# Patient Record
Sex: FEMALE | Race: WHITE | NOT HISPANIC OR LATINO | Employment: OTHER | URBAN - METROPOLITAN AREA
[De-identification: names, ages, dates, MRNs, and addresses within clinical notes are randomized per-mention and may not be internally consistent; named-entity substitution may affect disease eponyms.]

---

## 2023-11-28 ENCOUNTER — OFFICE VISIT (OUTPATIENT)
Dept: OBSTETRICS AND GYNECOLOGY | Facility: CLINIC | Age: 73
End: 2023-11-28
Payer: MEDICARE

## 2023-11-28 VITALS
SYSTOLIC BLOOD PRESSURE: 153 MMHG | WEIGHT: 138.2 LBS | HEIGHT: 64 IN | BODY MASS INDEX: 23.6 KG/M2 | DIASTOLIC BLOOD PRESSURE: 70 MMHG

## 2023-11-28 DIAGNOSIS — Z12.31 ENCOUNTER FOR SCREENING MAMMOGRAM FOR MALIGNANT NEOPLASM OF BREAST: ICD-10-CM

## 2023-11-28 DIAGNOSIS — M81.0 AGE-RELATED OSTEOPOROSIS WITHOUT CURRENT PATHOLOGICAL FRACTURE: ICD-10-CM

## 2023-11-28 DIAGNOSIS — N95.1 MENOPAUSAL SYMPTOM: Primary | ICD-10-CM

## 2023-11-28 PROCEDURE — 99213 OFFICE O/P EST LOW 20 MIN: CPT

## 2023-11-28 PROCEDURE — 1036F TOBACCO NON-USER: CPT

## 2023-11-28 PROCEDURE — 1159F MED LIST DOCD IN RCRD: CPT

## 2023-11-28 PROCEDURE — 1160F RVW MEDS BY RX/DR IN RCRD: CPT

## 2023-11-28 RX ORDER — FERROUS SULFATE, DRIED 160(50) MG
1 TABLET, EXTENDED RELEASE ORAL 3 TIMES DAILY
COMMUNITY
Start: 2023-11-09

## 2023-11-28 RX ORDER — MULTIVITAMIN
TABLET ORAL
COMMUNITY

## 2023-11-28 RX ORDER — ALENDRONATE SODIUM 70 MG/1
1 TABLET ORAL WEEKLY
COMMUNITY
Start: 2021-11-11 | End: 2024-05-07 | Stop reason: SDUPTHER

## 2023-11-28 ASSESSMENT — ENCOUNTER SYMPTOMS
CARDIOVASCULAR NEGATIVE: 0
ENDOCRINE NEGATIVE: 0
FATIGUE: 0
CHILLS: 0
SHORTNESS OF BREATH: 0
VOMITING: 0
EYES NEGATIVE: 0
ALLERGIC/IMMUNOLOGIC NEGATIVE: 0
NEUROLOGICAL NEGATIVE: 0
HEMATOLOGIC/LYMPHATIC NEGATIVE: 0
RESPIRATORY NEGATIVE: 0
ANAL BLEEDING: 0
DIZZINESS: 0
UNEXPECTED WEIGHT CHANGE: 0
NAUSEA: 0
ABDOMINAL PAIN: 0
COUGH: 0
DYSURIA: 0
PSYCHIATRIC NEGATIVE: 0
HEADACHES: 0
CONSTIPATION: 0
MUSCULOSKELETAL NEGATIVE: 0
BLOOD IN STOOL: 0
FEVER: 0
DIARRHEA: 0
GASTROINTESTINAL NEGATIVE: 0
CONSTITUTIONAL NEGATIVE: 0
COLOR CHANGE: 0

## 2023-11-28 ASSESSMENT — LIFESTYLE VARIABLES
SKIP TO QUESTIONS 9-10: 1
HOW OFTEN DO YOU HAVE SIX OR MORE DRINKS ON ONE OCCASION: NEVER
AUDIT-C TOTAL SCORE: 1
HOW OFTEN DO YOU HAVE A DRINK CONTAINING ALCOHOL: MONTHLY OR LESS
HOW MANY STANDARD DRINKS CONTAINING ALCOHOL DO YOU HAVE ON A TYPICAL DAY: 1 OR 2

## 2023-11-28 ASSESSMENT — PATIENT HEALTH QUESTIONNAIRE - PHQ9
2. FEELING DOWN, DEPRESSED OR HOPELESS: NOT AT ALL
SUM OF ALL RESPONSES TO PHQ9 QUESTIONS 1 & 2: 0
1. LITTLE INTEREST OR PLEASURE IN DOING THINGS: NOT AT ALL

## 2023-11-28 NOTE — PROGRESS NOTES
"Subjective   Catie Mazariegos is a 73 y.o. female who is here for a routine GYN exam.     Last saw Dr. Jacobo 10/2022. Since last seen, she developed kidney stone - dx'ed in ED 3/2023; follow up with her PCP showed elevated calcium levels, further saw endocrinology Dr. Remy for further evaluation which showed elevated PTH, saw endocrine surgery Dr. Marina Perkins with [facility] with parathyroidectomy on 23. Endocrinology currently has her supplementing with CA/D. Her labs have now normalized again, currently stable. Will be seeing endocrinology again soon. Currently still taking Fosamax for her history of osteoporosis.    Denies vaginal bleeding. Denies breast changes or concerns. Denies hematochezia or rectal bleeding.     Complaints: see HPI  Periods: menopausal  Pain: none    HRT: no  History of abnormal Pap smear: no  History of abnormal mammogram: no      OB History          3    Para   2    Term   2            AB   1    Living   2         SAB        IAB        Ectopic        Multiple        Live Births   2                  Review of Systems   Constitutional:  Negative for chills, fatigue, fever and unexpected weight change.   Respiratory:  Negative for cough and shortness of breath.    Gastrointestinal:  Negative for abdominal pain, anal bleeding, blood in stool, constipation, diarrhea, nausea and vomiting.   Genitourinary:  Negative for dyspareunia, dysuria, pelvic pain and vaginal discharge.   Skin:  Negative for color change and rash.   Neurological:  Negative for dizziness and headaches.       Objective   /70   Ht 1.626 m (5' 4\")   Wt 62.7 kg (138 lb 3.2 oz)   BMI 23.72 kg/m²        General:   Alert and oriented, in no acute distress   Neck: Supple. No visible thyromegaly.    Breast/Axilla: Normal to palpation bilaterally without masses, skin changes, or nipple discharge.    Abdomen: Soft, non-tender, without masses or organomegaly   Vulva: Normal architecture without erythema, masses, or " lesions.    Vagina: Normal mucosa without lesions, masses, or atrophy. No abnormal vaginal discharge.    Cervix: Normal; cervical polyp from os   Uterus: Normal mobile, non-enlarged uterus    Adnexa: Normal without masses or lesions   Pelvic Floor No POP noted. No high tone pelvic floor    Psych Normal affect. Normal mood.      Assessment/Plan   -No further need for pap based on age and history.  -Due for mammogram, ordered.  -UTD on cologuard, last completed 12/2022 negative.  -Recommended removal of what appears to be cervical polyp with one of the physicians in the office; patient will schedule.  -Hx DEXA confirmed osteoporosis, last scan 2021, due at this time for routine DEXA, ordered; patient currently taking Fosamax for about 2 years now, will reassess med tx with updated DEXA results. Most recent blood work s/p parathyroidectomy WNL, stable: 11/20/23 - calcium 9.3, PTH 40.    Maeve Shaffer PA-C

## 2023-12-13 ENCOUNTER — PROCEDURE VISIT (OUTPATIENT)
Dept: OBSTETRICS AND GYNECOLOGY | Facility: CLINIC | Age: 73
End: 2023-12-13
Payer: MEDICARE

## 2023-12-13 VITALS
HEIGHT: 64 IN | DIASTOLIC BLOOD PRESSURE: 74 MMHG | WEIGHT: 138 LBS | BODY MASS INDEX: 23.56 KG/M2 | SYSTOLIC BLOOD PRESSURE: 136 MMHG

## 2023-12-13 DIAGNOSIS — N84.1 CERVICAL POLYP: Primary | ICD-10-CM

## 2023-12-13 PROCEDURE — 57500 BIOPSY OF CERVIX: CPT | Performed by: OBSTETRICS & GYNECOLOGY

## 2023-12-13 PROCEDURE — 88305 TISSUE EXAM BY PATHOLOGIST: CPT | Mod: TC | Performed by: OBSTETRICS & GYNECOLOGY

## 2023-12-13 PROCEDURE — 88305 TISSUE EXAM BY PATHOLOGIST: CPT | Performed by: PATHOLOGY

## 2023-12-13 ASSESSMENT — LIFESTYLE VARIABLES
AUDIT-C TOTAL SCORE: 2
HOW OFTEN DO YOU HAVE SIX OR MORE DRINKS ON ONE OCCASION: NEVER
SKIP TO QUESTIONS 9-10: 1
HOW MANY STANDARD DRINKS CONTAINING ALCOHOL DO YOU HAVE ON A TYPICAL DAY: 1 OR 2
HOW OFTEN DO YOU HAVE A DRINK CONTAINING ALCOHOL: 2-4 TIMES A MONTH

## 2023-12-13 ASSESSMENT — PAIN SCALES - GENERAL: PAINLEVEL: 0-NO PAIN

## 2023-12-13 ASSESSMENT — ENCOUNTER SYMPTOMS
LOSS OF SENSATION IN FEET: 0
DEPRESSION: 0
OCCASIONAL FEELINGS OF UNSTEADINESS: 0

## 2023-12-13 NOTE — PROGRESS NOTES
Cervical biopsy    Date/Time: 12/13/2023 8:35 AM    Performed by: Glory Madison MD  Authorized by: Glory Madison MD    Consent:     Consent obtained:  Verbal    Consent given by:  Patient    Procedural risks discussed: yes.  Universal protocol:     Procedure explained and questions answered to patient or proxy's satisfaction: yes    Indications:     Indications:  Cervical polyp  Sedation:     Sedation type:  None  Anesthesia:     Anesthesia method:  None  Procedure specific details:      Polyp grasped with ring forceps and removed in total  Post-procedure details:     Procedure completion:  Tolerated well, no immediate complications

## 2023-12-15 LAB
LABORATORY COMMENT REPORT: NORMAL
PATH REPORT.FINAL DX SPEC: NORMAL
PATH REPORT.GROSS SPEC: NORMAL
PATH REPORT.RELEVANT HX SPEC: NORMAL
PATH REPORT.TOTAL CANCER: NORMAL

## 2024-01-04 ENCOUNTER — ANCILLARY PROCEDURE (OUTPATIENT)
Dept: RADIOLOGY | Facility: CLINIC | Age: 74
End: 2024-01-04
Payer: MEDICARE

## 2024-01-04 VITALS — WEIGHT: 136 LBS | HEIGHT: 64 IN | BODY MASS INDEX: 23.22 KG/M2

## 2024-01-04 DIAGNOSIS — Z12.31 ENCOUNTER FOR SCREENING MAMMOGRAM FOR MALIGNANT NEOPLASM OF BREAST: ICD-10-CM

## 2024-01-04 DIAGNOSIS — M81.0 AGE-RELATED OSTEOPOROSIS WITHOUT CURRENT PATHOLOGICAL FRACTURE: ICD-10-CM

## 2024-01-04 PROCEDURE — 77085 DXA BONE DENSITY AXL VRT FX: CPT

## 2024-01-04 PROCEDURE — 77067 SCR MAMMO BI INCL CAD: CPT

## 2024-05-07 ENCOUNTER — TELEPHONE (OUTPATIENT)
Dept: OBSTETRICS AND GYNECOLOGY | Facility: CLINIC | Age: 74
End: 2024-05-07
Payer: MEDICARE

## 2024-05-07 DIAGNOSIS — M81.0 AGE-RELATED OSTEOPOROSIS WITHOUT CURRENT PATHOLOGICAL FRACTURE: Primary | ICD-10-CM

## 2024-05-07 RX ORDER — ALENDRONATE SODIUM 70 MG/1
70 TABLET ORAL
Qty: 12 TABLET | Refills: 3 | Status: SHIPPED | OUTPATIENT
Start: 2024-05-07 | End: 2025-05-07

## 2024-05-07 NOTE — TELEPHONE ENCOUNTER
Pt calling requesting refill for her Alendronate 70 mg to be sent to her mail [facility]

## 2024-06-17 ENCOUNTER — APPOINTMENT (OUTPATIENT)
Dept: PRIMARY CARE | Facility: CLINIC | Age: 74
End: 2024-06-17
Payer: MEDICARE

## 2024-06-20 ASSESSMENT — PROMIS GLOBAL HEALTH SCALE
RATE_GENERAL_HEALTH: VERY GOOD
RATE_QUALITY_OF_LIFE: EXCELLENT
RATE_SOCIAL_SATISFACTION: EXCELLENT
RATE_MENTAL_HEALTH: EXCELLENT
CARRYOUT_SOCIAL_ACTIVITIES: EXCELLENT
CARRYOUT_PHYSICAL_ACTIVITIES: COMPLETELY
EMOTIONAL_PROBLEMS: NEVER
RATE_PHYSICAL_HEALTH: VERY GOOD
RATE_AVERAGE_PAIN: 0

## 2024-06-21 ENCOUNTER — OFFICE VISIT (OUTPATIENT)
Dept: PRIMARY CARE | Facility: CLINIC | Age: 74
End: 2024-06-21
Payer: MEDICARE

## 2024-06-21 VITALS
SYSTOLIC BLOOD PRESSURE: 128 MMHG | OXYGEN SATURATION: 96 % | BODY MASS INDEX: 23.87 KG/M2 | HEART RATE: 75 BPM | TEMPERATURE: 97.2 F | HEIGHT: 64 IN | DIASTOLIC BLOOD PRESSURE: 80 MMHG | WEIGHT: 139.8 LBS

## 2024-06-21 DIAGNOSIS — Z00.00 MEDICARE ANNUAL WELLNESS VISIT, SUBSEQUENT: Primary | ICD-10-CM

## 2024-06-21 DIAGNOSIS — Z11.59 NEED FOR HEPATITIS C SCREENING TEST: ICD-10-CM

## 2024-06-21 PROCEDURE — 1160F RVW MEDS BY RX/DR IN RCRD: CPT | Performed by: FAMILY MEDICINE

## 2024-06-21 PROCEDURE — 1158F ADVNC CARE PLAN TLK DOCD: CPT | Performed by: FAMILY MEDICINE

## 2024-06-21 PROCEDURE — 99215 OFFICE O/P EST HI 40 MIN: CPT | Performed by: FAMILY MEDICINE

## 2024-06-21 PROCEDURE — 1123F ACP DISCUSS/DSCN MKR DOCD: CPT | Performed by: FAMILY MEDICINE

## 2024-06-21 PROCEDURE — G0439 PPPS, SUBSEQ VISIT: HCPCS | Performed by: FAMILY MEDICINE

## 2024-06-21 PROCEDURE — 1159F MED LIST DOCD IN RCRD: CPT | Performed by: FAMILY MEDICINE

## 2024-06-21 PROCEDURE — 1126F AMNT PAIN NOTED NONE PRSNT: CPT | Performed by: FAMILY MEDICINE

## 2024-06-21 RX ORDER — IBUPROFEN 100 MG/5ML
1000 SUSPENSION, ORAL (FINAL DOSE FORM) ORAL DAILY
COMMUNITY

## 2024-06-21 ASSESSMENT — COLUMBIA-SUICIDE SEVERITY RATING SCALE - C-SSRS
6. HAVE YOU EVER DONE ANYTHING, STARTED TO DO ANYTHING, OR PREPARED TO DO ANYTHING TO END YOUR LIFE?: NO
1. IN THE PAST MONTH, HAVE YOU WISHED YOU WERE DEAD OR WISHED YOU COULD GO TO SLEEP AND NOT WAKE UP?: NO
2. HAVE YOU ACTUALLY HAD ANY THOUGHTS OF KILLING YOURSELF?: NO

## 2024-06-21 ASSESSMENT — PAIN SCALES - GENERAL: PAINLEVEL: 0-NO PAIN

## 2024-06-21 ASSESSMENT — ENCOUNTER SYMPTOMS
LOSS OF SENSATION IN FEET: 0
DEPRESSION: 0
OCCASIONAL FEELINGS OF UNSTEADINESS: 0

## 2024-06-21 ASSESSMENT — PATIENT HEALTH QUESTIONNAIRE - PHQ9
1. LITTLE INTEREST OR PLEASURE IN DOING THINGS: NOT AT ALL
SUM OF ALL RESPONSES TO PHQ9 QUESTIONS 1 AND 2: 0
2. FEELING DOWN, DEPRESSED OR HOPELESS: NOT AT ALL

## 2024-06-21 NOTE — PROGRESS NOTES
Outpatient Visit Note    Chief Complaint   Patient presents with    Medicare Annual Wellness Visit Subsequent       HPI:  Catie Mazariegos is a 73 y.o. female here  for a Medicare Wellness Visit.    Health Maintenance  Immunizations: Tdap vaccination received at pharmacy 2023, next due 2033. Shingles vaccine is due.  Pneumococcal vaccination is due    Denies smoking or illicit drug use, drinks 1 alcoholic beverage a month. Patient reports routine vision checks and dental cleanings, and regular exercise with yoga and pilates, and walking. Patient is not fasting for routine blood work today.     Cologuard next due 2025. Screening pap N/A. Screening mammogram done recently and was negative. DEXA done recently, referred to rheumatology given her osteoporosis with slight worsening in the left femur neck and history of parathyroidectomy with being on Fosamax. She is seeing endocrinology who is managing this for her.     Otherwise denies fevers, chills, cold/flu symptoms, SOB, CP, N/V, abdominal pain, dysuria, hematuria, melena, diarrhea or LE edema     Advanced directives: some are in place      Living Will: Not Received    Healthcare Power of Atty: Not Received     Hearing screen: reports no difficulty with hearing and passes finger rub test bilaterally    Does the patient use opioid medications: No     How does the patient rate their health status today: excellent    Cognitive Screen:  AAAx3  to person, place and time: Yes  3 word recall: Apple, Car, Shoe - Immediate recall: Yes         - 5 minutes recall: Yes   Impression: No cognitive deficiency observed during screening or encounter today      Reviewed:   Past Medical History/Allergies:  Yes  Family History:  Yes  Social History:  Yes  Current Medications:  Yes  Vital Signs:  Yes  Advanced Directives:  discussed  Immunizations:  reviewed today  Home Safety:                    Up & Go test > 30 seconds?  No                   Home have rugs; lack grab bars in  bathroom; lack handrail on stairs; have poor lighting?  No                   Hearing difficulties?  No  Geriatric Assessment                   ADL areas requiring assistance:  Does not need help with Dressing, Eating, Ambulating, Toileting, Grooming, Hygiene.                    IADL areas requiring assistance:  Does not need help with Shopping, Housework, Accounting, Transportation, Driving.   Medications: reviewed  Current supplements:  Reviewed and recorded.   Other providers: Reviewed and recorded - Current providers and suppliers: Dr. Campbell, Dr. Luna, Dr. Perkins. Dr. Madison      PHQ9/GAD7:         Patient Active Problem List    Diagnosis Date Noted    Menopausal symptom 11/28/2023    Age-related osteoporosis without current pathological fracture 11/28/2023    Encounter for screening mammogram for malignant neoplasm of breast 11/28/2023        Past Medical History:   Diagnosis Date    Fibroid in the 1990's    History of uterine fibroid     Kidney stone 3-    Varicella         Current Medications  Current Outpatient Medications   Medication Instructions    alendronate (FOSAMAX) 70 mg, oral, Every 7 days    ascorbic acid (VITAMIN C) 1,000 mg, oral, Daily    calcium carbonate-vitamin D3 500 mg-5 mcg (200 unit) tablet 1 tablet, oral, 3 times daily    multivit with min-folic acid (One-A-Day Women's 50 Plus) 0.4 mg tablet as directed Orally        Allergies  Allergies   Allergen Reactions    Phenobarbital Hives and Rash        Immunizations  Immunization History   Administered Date(s) Administered    Flu vaccine (IIV4), preservative free *Check age/dose* 11/06/2020    Flu vaccine, quadrivalent, high-dose, preservative free, age 65y+ (FLUZONE) 10/17/2022    Influenza, High Dose Seasonal, Preservative Free 03/10/2020    Influenza, Seasonal, Quadrivalent, Adjuvanted 11/10/2021, 10/07/2023    Moderna COVID-19 vaccine, bivalent, blue cap/gray label *Check age/dose* 11/14/2022    Moderna SARS-CoV-2 Vaccination  03/10/2021, 01/06/2022        Past Surgical History:   Procedure Laterality Date    MYOMECTOMY  in the  1990's    OTHER SURGICAL HISTORY  2005    LIPOMA REMOVED FROM BACK    PARATHYROIDECTOMY  11/09/2023    LOWER LEFT    TONSILLECTOMY      UTERINE FIBROID SURGERY  1990    REMOVED     Family History   Problem Relation Name Age of Onset    Heart disease Mother Jelena     Heart disease Father Ascencion      Social History     Tobacco Use    Smoking status: Never    Smokeless tobacco: Never   Vaping Use    Vaping status: Never Used   Substance Use Topics    Alcohol use: Yes    Drug use: Never     Tobacco Use: Low Risk  (6/21/2024)    Patient History     Smoking Tobacco Use: Never     Smokeless Tobacco Use: Never     Passive Exposure: Not on file        ROS  All pertinent positive symptoms are included in the history of present illness.  All other systems have been reviewed and are negative and noncontributory to this patient's current ailments.    VITAL SIGNS  Vitals:    06/21/24 1042   BP: 128/80   Pulse: 75   Temp: 36.2 °C (97.2 °F)   SpO2: 96%     Vitals:    06/21/24 1042   Weight: 63.4 kg (139 lb 12.8 oz)      Body mass index is 24 kg/m².     PHYSICAL EXAM  GENERAL APPEARANCE: well nourished, well developed, looks like stated age, in no acute distress, not ill or tired appearing, conversing well.   HEENT: no trauma, normocephalic.   NECK: supple without rigidity, no neck mass was observed.   LUNGS: good chest wall expansion. In no acute respiratory distress.   EXTREMITIES: moving all extremities equally with no edema.   SKIN: normal skin color and pigmentation, without rash. Healed scar on neck.   NEUROLOGIC EXAM: CN II-XII grossly intact, normal gait.   PSYCH: mood and affect appropriate; alert and oriented to time, place, person; no difficulty with speech or language.     Counseling:       Medication education:           Education:  The patient is counseled regarding potential side-effects of all new medications           Understanding:  Patient expressed understanding of information conveyed today          Adherence:  No barriers to adherence identified    Preventative Services reviewed with patient and copy printed for patient.    Assessment/Plan   Problem List Items Addressed This Visit    None  Visit Diagnoses         Codes    Medicare annual wellness visit, subsequent    -  Primary Z00.00    Relevant Orders    Hepatitis C Antibody    Need for hepatitis C screening test     Z11.59    Relevant Orders    Hepatitis C Antibody            Additional Visit Plans:  Notes:  PREVENTATIVE HEALTH SCREENINGS INCLUDED:  - Blood pressure screen.  - Blood work may include a cholesterol and diabetes screen if risk factors exist (overweight, high blood pressure etc); screening for sexually transmitted infections; a one time HIV screen for all individuals, and Hepatitis C Virus screening  - I encourage you to eat a low-fat, moderate-carbohydrate, low-calorie diet to maintain a normal BMI (under 25) to reduce heart disease, and risk for diabetes  - Moderate intensity exercise for 30 minutes 5 days per week is recommended  - Helpful resources recommended by the American Academy of Family Practice can be found at www.familydoctor.org or www.choosemyplate.gov  - Can also consider enrolling in a program such as Weight Watchers or [employer]. The most effective diet is going to be one you can follow long term and turn into a lifestyle. The CDC recommends losing 0.5-2 lbs a week if overweight or obese.  - I recommend a routine vision check and dental cleanings every 6 months    - Colon cancer screening for all ages 45-75 or 85 years old periodically depending on results.  Cologuard next due 2025  - Cervical cancer screening (pap test) in women between 21-65 years old, periodically depending on results.  Not applicable  - Mammogram screening for breast cancer in women starting at 40-50 years and every 1-2 years.  Up-to-date  - Bone density screening  (DEXA) for osteoporosis in women aged 65 years and older, once every two years if needed.  Up-to-date, continue to follow with the specialist    IMMUNIZATIONS:  - Flu shot annually.  - Tetanus booster every 10 years.  Up to date. Next due 2032  - Two pneumococcal vaccinations after 65 years old. Please get this at the pharmacy and call us to update your records.   - Shingles vaccine for those 50 years or older.  Plan to check pricing at the pharmacy    This was a shared decision making visit.    Next Wellness Exam Due  In 1 year from today      Phillip Campbell DO   06/21/24   11:17 AM

## 2024-06-21 NOTE — PATIENT INSTRUCTIONS
Problem List Items Addressed This Visit    None  Visit Diagnoses         Codes    Medicare annual wellness visit, subsequent    -  Primary Z00.00    Relevant Orders    Hepatitis C Antibody    Need for hepatitis C screening test     Z11.59    Relevant Orders    Hepatitis C Antibody            Additional Visit Plans:  Notes:  PREVENTATIVE HEALTH SCREENINGS INCLUDED:  - Blood pressure screen.  - Blood work may include a cholesterol and diabetes screen if risk factors exist (overweight, high blood pressure etc); screening for sexually transmitted infections; a one time HIV screen for all individuals, and Hepatitis C Virus screening  - I encourage you to eat a low-fat, moderate-carbohydrate, low-calorie diet to maintain a normal BMI (under 25) to reduce heart disease, and risk for diabetes  - Moderate intensity exercise for 30 minutes 5 days per week is recommended  - Helpful resources recommended by the American Academy of Family Practice can be found at www.familydoctor.org or www.choosemyplate.gov  - Can also consider enrolling in a program such as Weight Watchers or [employer]. The most effective diet is going to be one you can follow long term and turn into a lifestyle. The CDC recommends losing 0.5-2 lbs a week if overweight or obese.  - I recommend a routine vision check and dental cleanings every 6 months    - Colon cancer screening for all ages 45-75 or 85 years old periodically depending on results.  Cologuard next due 2025  - Cervical cancer screening (pap test) in women between 21-65 years old, periodically depending on results.  Not applicable  - Mammogram screening for breast cancer in women starting at 40-50 years and every 1-2 years.  Up-to-date  - Bone density screening (DEXA) for osteoporosis in women aged 65 years and older, once every two years if needed.  Up-to-date, continue to follow with the specialist    IMMUNIZATIONS:  - Flu shot annually.  - Tetanus booster every 10 years.  Up to date. Next  due 2032  - Two pneumococcal vaccinations after 65 years old. Please get this at the pharmacy and call us to update your records.   - Shingles vaccine for those 50 years or older.  Plan to check pricing at the pharmacy    This was a shared decision making visit.    Next Wellness Exam Due  In 1 year from today      Phillip Campbell DO   06/21/24   11:16 AM

## 2025-01-16 ENCOUNTER — OFFICE VISIT (OUTPATIENT)
Dept: OBSTETRICS AND GYNECOLOGY | Facility: CLINIC | Age: 75
End: 2025-01-16
Payer: MEDICARE

## 2025-01-16 ENCOUNTER — LAB (OUTPATIENT)
Dept: LAB | Facility: LAB | Age: 75
End: 2025-01-16
Payer: MEDICARE

## 2025-01-16 VITALS
DIASTOLIC BLOOD PRESSURE: 64 MMHG | WEIGHT: 142 LBS | HEIGHT: 64 IN | BODY MASS INDEX: 24.24 KG/M2 | SYSTOLIC BLOOD PRESSURE: 176 MMHG

## 2025-01-16 DIAGNOSIS — Z11.59 NEED FOR HEPATITIS C SCREENING TEST: ICD-10-CM

## 2025-01-16 DIAGNOSIS — Z78.0 MENOPAUSE: Primary | ICD-10-CM

## 2025-01-16 DIAGNOSIS — M81.0 AGE-RELATED OSTEOPOROSIS WITHOUT CURRENT PATHOLOGICAL FRACTURE: ICD-10-CM

## 2025-01-16 DIAGNOSIS — Z00.00 MEDICARE ANNUAL WELLNESS VISIT, SUBSEQUENT: ICD-10-CM

## 2025-01-16 DIAGNOSIS — N81.4 UTERINE PROLAPSE: ICD-10-CM

## 2025-01-16 DIAGNOSIS — Z12.31 ENCOUNTER FOR SCREENING MAMMOGRAM FOR MALIGNANT NEOPLASM OF BREAST: ICD-10-CM

## 2025-01-16 PROCEDURE — 99213 OFFICE O/P EST LOW 20 MIN: CPT

## 2025-01-16 PROCEDURE — 1160F RVW MEDS BY RX/DR IN RCRD: CPT

## 2025-01-16 PROCEDURE — G0472 HEP C SCREEN HIGH RISK/OTHER: HCPCS

## 2025-01-16 PROCEDURE — 3008F BODY MASS INDEX DOCD: CPT

## 2025-01-16 PROCEDURE — 1159F MED LIST DOCD IN RCRD: CPT

## 2025-01-16 PROCEDURE — 1123F ACP DISCUSS/DSCN MKR DOCD: CPT

## 2025-01-16 PROCEDURE — 1126F AMNT PAIN NOTED NONE PRSNT: CPT

## 2025-01-16 PROCEDURE — 1036F TOBACCO NON-USER: CPT

## 2025-01-16 ASSESSMENT — ENCOUNTER SYMPTOMS
DEPRESSION: 0
LOSS OF SENSATION IN FEET: 0
OCCASIONAL FEELINGS OF UNSTEADINESS: 0

## 2025-01-16 ASSESSMENT — PATIENT HEALTH QUESTIONNAIRE - PHQ9
SUM OF ALL RESPONSES TO PHQ9 QUESTIONS 1 & 2: 0
1. LITTLE INTEREST OR PLEASURE IN DOING THINGS: NOT AT ALL
2. FEELING DOWN, DEPRESSED OR HOPELESS: NOT AT ALL

## 2025-01-16 ASSESSMENT — PAIN SCALES - GENERAL: PAINLEVEL_OUTOF10: 0-NO PAIN

## 2025-01-16 ASSESSMENT — LIFESTYLE VARIABLES
HOW MANY STANDARD DRINKS CONTAINING ALCOHOL DO YOU HAVE ON A TYPICAL DAY: 1 OR 2
AUDIT-C TOTAL SCORE: 1
HOW OFTEN DO YOU HAVE A DRINK CONTAINING ALCOHOL: MONTHLY OR LESS
HOW OFTEN DO YOU HAVE SIX OR MORE DRINKS ON ONE OCCASION: NEVER
SKIP TO QUESTIONS 9-10: 1

## 2025-01-16 NOTE — PROGRESS NOTES
"Subjective   Catie Mazariegos is a 74 y.o. female who is here for a routine menopausal visit. I last saw her 2023.  -Had cervical polyp removed with Dr. Madison 2023; denies vaginal bleeding.  -Hx of osteoporosis; hx of parathyroidectomy; sees Dr. Sandrita mathur at [facility]; upcoming appt 3/2025; she remains on Fosamax per endo but may be stopping this year. Last DEXA 2024.  -Denies breast changes or vaginal concerns today.    Complaints:   none  HRT: no   History of abnormal Pap smear: no  History of abnormal mammogram: no      OB History          3    Para   2    Term   2            AB   1    Living   2         SAB        IAB        Ectopic        Multiple        Live Births   2                  Review of Systems   Constitutional:  Negative for chills, fatigue, fever and unexpected weight change.   Respiratory:  Negative for cough and shortness of breath.    Gastrointestinal:  Negative for abdominal pain, nausea and vomiting.   Genitourinary:  Negative for dyspareunia, dysuria, pelvic pain and vaginal discharge.   Skin:  Negative for color change and rash.   Neurological:  Negative for dizziness and headaches.       Objective   /64   Ht 1.626 m (5' 4\")   Wt 64.4 kg (142 lb)   BMI 24.37 kg/m²        General:   Alert and oriented, in no acute distress   Neck: Supple. No visible thyromegaly.    Breast/Axilla: Normal to palpation bilaterally without masses, skin changes, or nipple discharge.    Abdomen: Soft, non-tender, without masses or organomegaly   Vulva: Normal architecture without erythema, masses, or lesions.    Vagina: Normal mucosa without lesions, masses, or atrophy. No abnormal vaginal discharge.    Cervix: Normal without masses, lesions, or signs of cervicitis; protrudes about 1/2 way down canal    Uterus: Normal, mobile, non-enlarged uterus; prolapse present    Adnexa: Normal without masses or lesions   Pelvic Floor Normal; good kegels observed   Psych Normal affect. Normal mood.  "     Assessment/Plan   Diagnoses and all orders for this visit:  Menopause   -No further need for pap based on age and history.  - UTD on cologuard 12/2022 negative.  Encounter for screening mammogram for malignant neoplasm of breast  -     BI mammo bilateral screening tomosynthesis; Future  Age-related osteoporosis without current pathological fracture   - UTD on DEXA 1/2024.   - Sees endo for mgt of s/p parathyroidectomy and osteoporosis; on Fosamax currently.   Uterine prolapse   - Not bothersome to patient; she is asymptomatic.   - Rvwd exam findings; encouraged kegels 25 times BID. TCO if she ever becomes symptomatic.     Maeve Shaffer PA-C

## 2025-01-17 ENCOUNTER — HOSPITAL ENCOUNTER (OUTPATIENT)
Dept: RADIOLOGY | Facility: CLINIC | Age: 75
Discharge: HOME | End: 2025-01-17
Payer: MEDICARE

## 2025-01-17 DIAGNOSIS — Z12.31 ENCOUNTER FOR SCREENING MAMMOGRAM FOR MALIGNANT NEOPLASM OF BREAST: ICD-10-CM

## 2025-01-17 LAB — HCV AB SER QL: NONREACTIVE

## 2025-01-17 PROCEDURE — 77067 SCR MAMMO BI INCL CAD: CPT

## 2025-01-17 ASSESSMENT — ENCOUNTER SYMPTOMS
DYSURIA: 0
SHORTNESS OF BREATH: 0
FATIGUE: 0
DIZZINESS: 0
ABDOMINAL PAIN: 0
CHILLS: 0
VOMITING: 0
FEVER: 0
COLOR CHANGE: 0
HEADACHES: 0
UNEXPECTED WEIGHT CHANGE: 0
COUGH: 0
NAUSEA: 0